# Patient Record
Sex: MALE | Race: WHITE | NOT HISPANIC OR LATINO
[De-identification: names, ages, dates, MRNs, and addresses within clinical notes are randomized per-mention and may not be internally consistent; named-entity substitution may affect disease eponyms.]

---

## 2017-06-14 ENCOUNTER — TRANSCRIPTION ENCOUNTER (OUTPATIENT)
Age: 52
End: 2017-06-14

## 2017-06-14 ENCOUNTER — OUTPATIENT (OUTPATIENT)
Dept: OUTPATIENT SERVICES | Facility: HOSPITAL | Age: 52
LOS: 1 days | End: 2017-06-14
Payer: MEDICARE

## 2017-06-14 VITALS
HEIGHT: 72 IN | RESPIRATION RATE: 14 BRPM | HEART RATE: 64 BPM | SYSTOLIC BLOOD PRESSURE: 122 MMHG | WEIGHT: 203.27 LBS | DIASTOLIC BLOOD PRESSURE: 76 MMHG | OXYGEN SATURATION: 99 % | TEMPERATURE: 97 F

## 2017-06-14 VITALS
RESPIRATION RATE: 14 BRPM | SYSTOLIC BLOOD PRESSURE: 123 MMHG | DIASTOLIC BLOOD PRESSURE: 72 MMHG | OXYGEN SATURATION: 99 % | HEART RATE: 72 BPM

## 2017-06-14 DIAGNOSIS — H25.11 AGE-RELATED NUCLEAR CATARACT, RIGHT EYE: ICD-10-CM

## 2017-06-14 PROCEDURE — 66984 XCAPSL CTRC RMVL W/O ECP: CPT | Mod: RT

## 2017-06-14 NOTE — ASU DISCHARGE PLAN (ADULT/PEDIATRIC). - NOTIFY
Bleeding that does not stop/Swelling that continues/Pain not relieved by Medications/Fever greater than 101/Persistent Nausea and Vomiting

## 2017-06-27 NOTE — ASU PATIENT PROFILE, ADULT - VISION (WITH CORRECTIVE LENSES IF THE PATIENT USUALLY WEARS THEM):
Partially impaired: cannot see medication labels or newsprint, but can see obstacles in path, and the surrounding layout; can count fingers at arm's length Partially impaired: cannot see medication labels or newsprint, but can see obstacles in path, and the surrounding layout; can count fingers at arm's length/left eye

## 2017-06-28 ENCOUNTER — TRANSCRIPTION ENCOUNTER (OUTPATIENT)
Age: 52
End: 2017-06-28

## 2017-06-28 ENCOUNTER — OUTPATIENT (OUTPATIENT)
Dept: OUTPATIENT SERVICES | Facility: HOSPITAL | Age: 52
LOS: 1 days | End: 2017-06-28
Payer: MEDICARE

## 2017-06-28 VITALS
DIASTOLIC BLOOD PRESSURE: 75 MMHG | SYSTOLIC BLOOD PRESSURE: 129 MMHG | RESPIRATION RATE: 16 BRPM | HEART RATE: 69 BPM | OXYGEN SATURATION: 100 %

## 2017-06-28 VITALS
RESPIRATION RATE: 12 BRPM | HEART RATE: 61 BPM | OXYGEN SATURATION: 100 % | WEIGHT: 207.23 LBS | TEMPERATURE: 98 F | HEIGHT: 72 IN

## 2017-06-28 DIAGNOSIS — H25.12 AGE-RELATED NUCLEAR CATARACT, LEFT EYE: ICD-10-CM

## 2017-06-28 DIAGNOSIS — Z98.49 CATARACT EXTRACTION STATUS, UNSPECIFIED EYE: Chronic | ICD-10-CM

## 2017-06-28 PROCEDURE — 66984 XCAPSL CTRC RMVL W/O ECP: CPT | Mod: LT

## 2017-06-28 NOTE — ASU DISCHARGE PLAN (ADULT/PEDIATRIC). - NOTIFY
Persistent Nausea and Vomiting/Inability to Tolerate Liquids or Foods/Pain not relieved by Medications/Increased Irritability or Sluggishness/Fever greater than 101/Bleeding that does not stop

## 2017-07-28 ENCOUNTER — NON-APPOINTMENT (OUTPATIENT)
Age: 52
End: 2017-07-28

## 2017-07-28 ENCOUNTER — APPOINTMENT (OUTPATIENT)
Dept: CARDIOLOGY | Facility: CLINIC | Age: 52
End: 2017-07-28
Payer: MEDICARE

## 2017-07-28 VITALS
SYSTOLIC BLOOD PRESSURE: 110 MMHG | WEIGHT: 208 LBS | OXYGEN SATURATION: 97 % | DIASTOLIC BLOOD PRESSURE: 68 MMHG | HEIGHT: 73 IN | HEART RATE: 61 BPM | BODY MASS INDEX: 27.57 KG/M2

## 2017-07-28 DIAGNOSIS — Z00.00 ENCOUNTER FOR GENERAL ADULT MEDICAL EXAMINATION W/OUT ABNORMAL FINDINGS: ICD-10-CM

## 2017-07-28 DIAGNOSIS — F79 UNSPECIFIED INTELLECTUAL DISABILITIES: ICD-10-CM

## 2017-07-28 DIAGNOSIS — F17.200 NICOTINE DEPENDENCE, UNSPECIFIED, UNCOMPLICATED: ICD-10-CM

## 2017-07-28 PROCEDURE — 93000 ELECTROCARDIOGRAM COMPLETE: CPT

## 2017-07-28 PROCEDURE — 99204 OFFICE O/P NEW MOD 45 MIN: CPT | Mod: 25

## 2017-07-28 RX ORDER — FOLIC ACID 1 MG/1
1 TABLET ORAL DAILY
Qty: 30 | Refills: 0 | Status: ACTIVE | COMMUNITY

## 2017-07-28 RX ORDER — ASPIRIN 81 MG
81 TABLET, DELAYED RELEASE (ENTERIC COATED) ORAL DAILY
Qty: 1 | Refills: 0 | Status: ACTIVE | COMMUNITY

## 2017-07-28 RX ORDER — DIVALPROEX SODIUM 500 MG/1
500 TABLET, DELAYED RELEASE ORAL
Refills: 0 | Status: ACTIVE | COMMUNITY

## 2017-07-28 RX ORDER — CLONAZEPAM 0.5 MG/1
0.5 TABLET ORAL
Refills: 0 | Status: ACTIVE | COMMUNITY

## 2017-07-28 RX ORDER — MULTIVITAMIN
TABLET ORAL DAILY
Refills: 0 | Status: ACTIVE | COMMUNITY

## 2017-07-28 RX ORDER — FENOFIBRATE 145 MG/1
145 TABLET, COATED ORAL
Qty: 30 | Refills: 0 | Status: ACTIVE | COMMUNITY

## 2017-08-25 ENCOUNTER — MEDICATION RENEWAL (OUTPATIENT)
Age: 52
End: 2017-08-25

## 2017-10-04 ENCOUNTER — APPOINTMENT (OUTPATIENT)
Dept: RADIOLOGY | Facility: CLINIC | Age: 52
End: 2017-10-04

## 2017-10-04 ENCOUNTER — OUTPATIENT (OUTPATIENT)
Dept: OUTPATIENT SERVICES | Facility: HOSPITAL | Age: 52
LOS: 1 days | End: 2017-10-04
Payer: MEDICARE

## 2017-10-04 DIAGNOSIS — Z00.8 ENCOUNTER FOR OTHER GENERAL EXAMINATION: ICD-10-CM

## 2017-10-04 DIAGNOSIS — Z98.49 CATARACT EXTRACTION STATUS, UNSPECIFIED EYE: Chronic | ICD-10-CM

## 2017-10-04 PROCEDURE — 77081 DXA BONE DENSITY APPENDICULR: CPT

## 2017-10-04 PROCEDURE — 77081 DXA BONE DENSITY APPENDICULR: CPT | Mod: 26

## 2018-01-12 ENCOUNTER — APPOINTMENT (OUTPATIENT)
Dept: CARDIOLOGY | Facility: CLINIC | Age: 53
End: 2018-01-12
Payer: MEDICARE

## 2018-01-12 ENCOUNTER — NON-APPOINTMENT (OUTPATIENT)
Age: 53
End: 2018-01-12

## 2018-01-12 VITALS
WEIGHT: 211 LBS | HEART RATE: 57 BPM | OXYGEN SATURATION: 98 % | HEIGHT: 73 IN | SYSTOLIC BLOOD PRESSURE: 113 MMHG | DIASTOLIC BLOOD PRESSURE: 72 MMHG | BODY MASS INDEX: 27.96 KG/M2

## 2018-01-12 PROCEDURE — 93000 ELECTROCARDIOGRAM COMPLETE: CPT

## 2018-01-12 PROCEDURE — 99214 OFFICE O/P EST MOD 30 MIN: CPT | Mod: 25

## 2018-03-29 ENCOUNTER — APPOINTMENT (OUTPATIENT)
Dept: MRI IMAGING | Facility: CLINIC | Age: 53
End: 2018-03-29

## 2018-04-04 ENCOUNTER — APPOINTMENT (OUTPATIENT)
Dept: CARDIOLOGY | Facility: CLINIC | Age: 53
End: 2018-04-04
Payer: MEDICARE

## 2018-04-04 PROCEDURE — 93306 TTE W/DOPPLER COMPLETE: CPT

## 2018-04-27 ENCOUNTER — APPOINTMENT (OUTPATIENT)
Dept: MRI IMAGING | Facility: CLINIC | Age: 53
End: 2018-04-27

## 2018-05-07 ENCOUNTER — OUTPATIENT (OUTPATIENT)
Dept: OUTPATIENT SERVICES | Facility: HOSPITAL | Age: 53
LOS: 1 days | End: 2018-05-07
Payer: MEDICARE

## 2018-05-07 ENCOUNTER — APPOINTMENT (OUTPATIENT)
Dept: MRI IMAGING | Facility: CLINIC | Age: 53
End: 2018-05-07
Payer: MEDICARE

## 2018-05-07 DIAGNOSIS — Z00.8 ENCOUNTER FOR OTHER GENERAL EXAMINATION: ICD-10-CM

## 2018-05-07 DIAGNOSIS — Z98.49 CATARACT EXTRACTION STATUS, UNSPECIFIED EYE: Chronic | ICD-10-CM

## 2018-05-07 PROCEDURE — A9585: CPT

## 2018-05-07 PROCEDURE — 74183 MRI ABD W/O CNTR FLWD CNTR: CPT | Mod: 26

## 2018-05-07 PROCEDURE — 74183 MRI ABD W/O CNTR FLWD CNTR: CPT

## 2018-05-22 ENCOUNTER — APPOINTMENT (OUTPATIENT)
Dept: CARDIOLOGY | Facility: CLINIC | Age: 53
End: 2018-05-22

## 2018-05-30 ENCOUNTER — APPOINTMENT (OUTPATIENT)
Dept: CARDIOLOGY | Facility: CLINIC | Age: 53
End: 2018-05-30
Payer: MEDICARE

## 2018-05-30 PROCEDURE — 93015 CV STRESS TEST SUPVJ I&R: CPT

## 2018-07-20 ENCOUNTER — NON-APPOINTMENT (OUTPATIENT)
Age: 53
End: 2018-07-20

## 2018-07-20 ENCOUNTER — APPOINTMENT (OUTPATIENT)
Dept: CARDIOLOGY | Facility: CLINIC | Age: 53
End: 2018-07-20
Payer: MEDICARE

## 2018-07-20 VITALS — DIASTOLIC BLOOD PRESSURE: 66 MMHG | SYSTOLIC BLOOD PRESSURE: 101 MMHG | HEART RATE: 51 BPM | OXYGEN SATURATION: 98 %

## 2018-07-20 VITALS — HEIGHT: 73 IN | BODY MASS INDEX: 28.89 KG/M2 | WEIGHT: 218 LBS

## 2018-07-20 PROBLEM — E78.1 PURE HYPERGLYCERIDEMIA: Chronic | Status: ACTIVE | Noted: 2017-06-14

## 2018-07-20 PROBLEM — F20.9 SCHIZOPHRENIA, UNSPECIFIED: Chronic | Status: ACTIVE | Noted: 2017-06-14

## 2018-07-20 PROBLEM — I10 ESSENTIAL (PRIMARY) HYPERTENSION: Chronic | Status: ACTIVE | Noted: 2017-06-14

## 2018-07-20 PROCEDURE — 99214 OFFICE O/P EST MOD 30 MIN: CPT | Mod: 25

## 2018-07-20 PROCEDURE — 93000 ELECTROCARDIOGRAM COMPLETE: CPT

## 2018-07-20 RX ORDER — MULTIVIT-MIN/FOLIC/VIT K/LYCOP 400-300MCG
50 MCG TABLET ORAL
Qty: 90 | Refills: 3 | Status: ACTIVE | COMMUNITY

## 2018-07-20 RX ORDER — PNV NO.95/FERROUS FUM/FOLIC AC 28MG-0.8MG
TABLET ORAL DAILY
Refills: 0 | Status: ACTIVE | COMMUNITY

## 2018-09-23 NOTE — ASU DISCHARGE PLAN (ADULT/PEDIATRIC). - NO HEAVY LIFTING, STRAINING, OR BENDING
Statement Selected Type 2 diabetes mellitus with diabetic cataract, unspecified whether long term insulin use

## 2018-10-16 ENCOUNTER — OUTPATIENT (OUTPATIENT)
Dept: OUTPATIENT SERVICES | Facility: HOSPITAL | Age: 53
LOS: 1 days | End: 2018-10-16
Payer: MEDICARE

## 2018-10-16 ENCOUNTER — APPOINTMENT (OUTPATIENT)
Dept: MRI IMAGING | Facility: CLINIC | Age: 53
End: 2018-10-16
Payer: MEDICARE

## 2018-10-16 DIAGNOSIS — E22.1 HYPERPROLACTINEMIA: ICD-10-CM

## 2018-10-16 DIAGNOSIS — Z98.49 CATARACT EXTRACTION STATUS, UNSPECIFIED EYE: Chronic | ICD-10-CM

## 2018-10-16 DIAGNOSIS — E29.1 TESTICULAR HYPOFUNCTION: ICD-10-CM

## 2018-10-16 DIAGNOSIS — Z00.8 ENCOUNTER FOR OTHER GENERAL EXAMINATION: ICD-10-CM

## 2018-10-16 PROCEDURE — A9585: CPT

## 2018-10-16 PROCEDURE — 70553 MRI BRAIN STEM W/O & W/DYE: CPT

## 2018-10-16 PROCEDURE — 82565 ASSAY OF CREATININE: CPT

## 2018-10-16 PROCEDURE — 70553 MRI BRAIN STEM W/O & W/DYE: CPT | Mod: 26

## 2019-02-08 ENCOUNTER — NON-APPOINTMENT (OUTPATIENT)
Age: 54
End: 2019-02-08

## 2019-02-08 ENCOUNTER — APPOINTMENT (OUTPATIENT)
Dept: CARDIOLOGY | Facility: CLINIC | Age: 54
End: 2019-02-08
Payer: MEDICARE

## 2019-02-08 VITALS — WEIGHT: 222 LBS | BODY MASS INDEX: 29.42 KG/M2 | HEIGHT: 73 IN

## 2019-02-08 VITALS — OXYGEN SATURATION: 100 % | HEART RATE: 55 BPM | DIASTOLIC BLOOD PRESSURE: 72 MMHG | SYSTOLIC BLOOD PRESSURE: 114 MMHG

## 2019-02-08 PROCEDURE — 93000 ELECTROCARDIOGRAM COMPLETE: CPT

## 2019-02-08 PROCEDURE — 99214 OFFICE O/P EST MOD 30 MIN: CPT | Mod: 25

## 2019-02-08 NOTE — HISTORY OF PRESENT ILLNESS
[FreeTextEntry1] : Patient with mental retardation with behavioral disorder, hypertension , dyslipidemia came for follow up \par \par  Patient denies any active symptoms , denies any chest pain or shortness of breath , \par \par he smokes every day 6 cigarettes a day ,started in his high school , and he does not want to  quit smoking ,.\par \par Patient had blood work    showing HDL very low  others normal range  .  his blood pressure is controlled on medication . \par \par Patient is physically active ,   \par \par Patients echo showed mild LVH ,   could not walk longer  on treadmill ,

## 2019-02-08 NOTE — ASSESSMENT
[FreeTextEntry1] : Patient with above hx \par \par \par Controlled    Hypertension continue low salt diet , continue medication , \par \par Non specific EKG changes without active cardiac symptoms , mild hypertensive heart disease LVH with normal EF , patient was unable to walk longer to achieve target heart rate , patient denies any chest pain , will continue to observe \par \par dyslipidemia , low HDL  on current medication  , without active cardiac symptoms,   follow up lipid profile ,LFT periodically as patient is on medication \par \par current active smoker   advised the patient  to quit , however he  does not want to  quit \par \par follow up after 6 months . please fax me recent blood work reports

## 2019-02-08 NOTE — PHYSICAL EXAM
[General Appearance - Well Developed] : well developed [Normal Conjunctiva] : the conjunctiva exhibited no abnormalities [Normal Oral Mucosa] : normal oral mucosa [Normal Jugular Venous A Waves Present] : normal jugular venous A waves present [] : no respiratory distress [Respiration, Rhythm And Depth] : normal respiratory rhythm and effort [Exaggerated Use Of Accessory Muscles For Inspiration] : no accessory muscle use [Auscultation Breath Sounds / Voice Sounds] : lungs were clear to auscultation bilaterally [Chest Palpation] : palpation of the chest revealed no abnormalities [Lungs Percussion] : the lungs were normal to percussion [Heart Rate And Rhythm] : heart rate and rhythm were normal [Heart Sounds] : normal S1 and S2 [Murmurs] : no murmurs present [Arterial Pulses Normal] : the arterial pulses were normal [Edema] : no peripheral edema present [Veins - Varicosity Changes] : no varicosital changes were noted in the lower extremities [Bowel Sounds] : normal bowel sounds [Abdomen Soft] : soft [Abnormal Walk] : normal gait [Nail Clubbing] : no clubbing of the fingernails [Cyanosis, Localized] : no localized cyanosis [Skin Turgor] : normal skin turgor [Oriented To Time, Place, And Person] : oriented to person, place, and time

## 2019-02-08 NOTE — REVIEW OF SYSTEMS
[Fever] : no fever [Chills] : no chills [Eyeglasses] : not currently wearing eyeglasses [Earache] : no earache [Mouth Sores] : no mouth sores [Shortness Of Breath] : no shortness of breath [Chest Pain] : no chest pain [Palpitations] : no palpitations [Cough] : no cough [Dysphagia] : no dysphagia [Urinary Frequency] : no change in urinary frequency [Skin: A Rash] : no rash: [Skin Lesions] : no skin lesions [Dizziness] : no dizziness [Convulsions] : no convulsions [Confusion] : no confusion was observed [Excessive Thirst] : no polydipsia

## 2019-05-15 ENCOUNTER — APPOINTMENT (OUTPATIENT)
Dept: MRI IMAGING | Facility: CLINIC | Age: 54
End: 2019-05-15
Payer: MEDICARE

## 2019-05-15 ENCOUNTER — OUTPATIENT (OUTPATIENT)
Dept: OUTPATIENT SERVICES | Facility: HOSPITAL | Age: 54
LOS: 1 days | End: 2019-05-15
Payer: MEDICARE

## 2019-05-15 DIAGNOSIS — Z00.8 ENCOUNTER FOR OTHER GENERAL EXAMINATION: ICD-10-CM

## 2019-05-15 DIAGNOSIS — Z98.49 CATARACT EXTRACTION STATUS, UNSPECIFIED EYE: Chronic | ICD-10-CM

## 2019-05-15 PROCEDURE — A9585: CPT

## 2019-05-15 PROCEDURE — 74183 MRI ABD W/O CNTR FLWD CNTR: CPT

## 2019-05-15 PROCEDURE — 74183 MRI ABD W/O CNTR FLWD CNTR: CPT | Mod: 26

## 2019-08-15 ENCOUNTER — NON-APPOINTMENT (OUTPATIENT)
Age: 54
End: 2019-08-15

## 2019-08-15 ENCOUNTER — APPOINTMENT (OUTPATIENT)
Dept: CARDIOLOGY | Facility: CLINIC | Age: 54
End: 2019-08-15
Payer: MEDICARE

## 2019-08-15 VITALS — HEIGHT: 73 IN | BODY MASS INDEX: 30.22 KG/M2 | WEIGHT: 228 LBS

## 2019-08-15 VITALS — DIASTOLIC BLOOD PRESSURE: 64 MMHG | OXYGEN SATURATION: 97 % | SYSTOLIC BLOOD PRESSURE: 100 MMHG | HEART RATE: 66 BPM

## 2019-08-15 PROCEDURE — 99214 OFFICE O/P EST MOD 30 MIN: CPT | Mod: 25

## 2019-08-15 PROCEDURE — 93000 ELECTROCARDIOGRAM COMPLETE: CPT

## 2019-08-16 NOTE — ASSESSMENT
[FreeTextEntry1] : HTN: Controlled\par \par MR: Mild by Echo\par \par HLD: Advised Lipids  ( Not seen on prior Labs ) \par \par Advised smoking cessation patient declines states does not wish to quit \par \par OV 6 months

## 2019-08-16 NOTE — HISTORY OF PRESENT ILLNESS
[FreeTextEntry1] : 53 Y/O [place]  PMH: Mentally Challenged HTN, HLD, MR who presents today on routine cardiac F/U\par \par Claims to be feeling well is accompanied VIA formal caregiver \par \par  Patient denies any active symptoms , denies any chest pain or shortness of breath , \par \par he smokes every day 6 cigarettes a day ,started in his high school , and he does not want to  quit smoking ,.\par \par Patient is physically active W/O limitations/restrictions  ,   \par

## 2020-02-13 ENCOUNTER — APPOINTMENT (OUTPATIENT)
Dept: CARDIOLOGY | Facility: CLINIC | Age: 55
End: 2020-02-13

## 2020-03-06 ENCOUNTER — NON-APPOINTMENT (OUTPATIENT)
Age: 55
End: 2020-03-06

## 2020-03-06 ENCOUNTER — APPOINTMENT (OUTPATIENT)
Dept: CARDIOLOGY | Facility: CLINIC | Age: 55
End: 2020-03-06
Payer: MEDICARE

## 2020-03-06 VITALS
BODY MASS INDEX: 30.48 KG/M2 | DIASTOLIC BLOOD PRESSURE: 69 MMHG | HEIGHT: 73 IN | WEIGHT: 230 LBS | SYSTOLIC BLOOD PRESSURE: 108 MMHG | OXYGEN SATURATION: 98 % | HEART RATE: 71 BPM

## 2020-03-06 VITALS
HEIGHT: 73 IN | HEART RATE: 71 BPM | BODY MASS INDEX: 30.48 KG/M2 | SYSTOLIC BLOOD PRESSURE: 108 MMHG | DIASTOLIC BLOOD PRESSURE: 69 MMHG | WEIGHT: 230 LBS

## 2020-03-06 PROCEDURE — 99214 OFFICE O/P EST MOD 30 MIN: CPT

## 2020-03-06 PROCEDURE — 93000 ELECTROCARDIOGRAM COMPLETE: CPT

## 2020-03-06 RX ORDER — NORMAL SALT TABLETS 1 G/G
1 TABLET ORAL TWICE DAILY
Refills: 0 | Status: ACTIVE | COMMUNITY

## 2020-03-06 NOTE — PHYSICAL EXAM
[General Appearance - Well Developed] : well developed [Normal Appearance] : normal appearance [Well Groomed] : well groomed [General Appearance - Well Nourished] : well nourished [No Deformities] : no deformities [General Appearance - In No Acute Distress] : no acute distress [Normal Conjunctiva] : the conjunctiva exhibited no abnormalities [] : no respiratory distress [Respiration, Rhythm And Depth] : normal respiratory rhythm and effort [Exaggerated Use Of Accessory Muscles For Inspiration] : no accessory muscle use [Auscultation Breath Sounds / Voice Sounds] : lungs were clear to auscultation bilaterally [Heart Rate And Rhythm] : heart rate and rhythm were normal [Heart Sounds] : normal S1 and S2 [Abdomen Soft] : soft [Abdomen Tenderness] : non-tender [Abnormal Walk] : normal gait [FreeTextEntry1] : No LE Edema  [Skin Color & Pigmentation] : normal skin color and pigmentation [Oriented To Time, Place, And Person] : oriented to person, place, and time

## 2020-03-06 NOTE — ASSESSMENT
[FreeTextEntry1] : HTN: Controlled continue medication \par \par MR: Mild by Echo\par \par HLD: Advised Lipids  ( Not seen on prior Labs )  will order blood work \par \par Advised smoking cessation patient declines states does not wish to quit \par \par OV 6 months

## 2020-09-10 ENCOUNTER — APPOINTMENT (OUTPATIENT)
Dept: CARDIOLOGY | Facility: CLINIC | Age: 55
End: 2020-09-10
Payer: MEDICARE

## 2020-09-10 ENCOUNTER — NON-APPOINTMENT (OUTPATIENT)
Age: 55
End: 2020-09-10

## 2020-09-10 VITALS
OXYGEN SATURATION: 98 % | DIASTOLIC BLOOD PRESSURE: 68 MMHG | BODY MASS INDEX: 32.2 KG/M2 | HEART RATE: 66 BPM | WEIGHT: 243 LBS | SYSTOLIC BLOOD PRESSURE: 106 MMHG | HEIGHT: 73 IN

## 2020-09-10 PROCEDURE — 93000 ELECTROCARDIOGRAM COMPLETE: CPT

## 2020-09-10 PROCEDURE — 99214 OFFICE O/P EST MOD 30 MIN: CPT

## 2020-09-10 NOTE — HISTORY OF PRESENT ILLNESS
[FreeTextEntry1] : Patient with hx of  Mentally Challenged HTN, HLD, MR who presents today on routine cardiac F/U along with caregiver says he is doing well , \par \par Patient denies any active symptoms , denies any chest pain or shortness of breath , \par \par he smokes every day 6 cigarettes a day , he does not want to  quit smoking ,.\par \par Patient blood work reports were not provided , ( did not do the blood work that was ordered )  his blood pressure is controlled  \par

## 2020-09-10 NOTE — PHYSICAL EXAM
[General Appearance - Well Developed] : well developed [Normal Appearance] : normal appearance [General Appearance - Well Nourished] : well nourished [Well Groomed] : well groomed [No Deformities] : no deformities [General Appearance - In No Acute Distress] : no acute distress [Normal Conjunctiva] : the conjunctiva exhibited no abnormalities [] : no respiratory distress [Respiration, Rhythm And Depth] : normal respiratory rhythm and effort [Exaggerated Use Of Accessory Muscles For Inspiration] : no accessory muscle use [Auscultation Breath Sounds / Voice Sounds] : lungs were clear to auscultation bilaterally [Heart Rate And Rhythm] : heart rate and rhythm were normal [Abdomen Tenderness] : non-tender [Heart Sounds] : normal S1 and S2 [Abdomen Soft] : soft [Abnormal Walk] : normal gait [FreeTextEntry1] : No LE Edema  [Skin Color & Pigmentation] : normal skin color and pigmentation [Oriented To Time, Place, And Person] : oriented to person, place, and time

## 2020-09-10 NOTE — ASSESSMENT
[FreeTextEntry1] : HTN: Controlled continue medication \par \par MR: Mild by Echo\par \par HLD: Advised Lipids  ( Not seen on prior Labs )  will order blood work  please fax me once results available \par \par Advised smoking cessation patient declines states does not wish to quit \par \par OV 6 months

## 2021-03-11 ENCOUNTER — APPOINTMENT (OUTPATIENT)
Dept: CARDIOLOGY | Facility: CLINIC | Age: 56
End: 2021-03-11

## 2021-06-25 NOTE — PHYSICAL EXAM
Returned call to patient. Name and  verified. Advised that we would not be able to prescribe anything for pain. She could try OTC extra strength tylenol or use orajel to the area.  She expressed understanding and was appreciative of call [General Appearance - Well Developed] : well developed [Normal Appearance] : normal appearance [Well Groomed] : well groomed [General Appearance - Well Nourished] : well nourished [No Deformities] : no deformities [General Appearance - In No Acute Distress] : no acute distress [Normal Conjunctiva] : the conjunctiva exhibited no abnormalities [] : no respiratory distress [Respiration, Rhythm And Depth] : normal respiratory rhythm and effort [Auscultation Breath Sounds / Voice Sounds] : lungs were clear to auscultation bilaterally [Exaggerated Use Of Accessory Muscles For Inspiration] : no accessory muscle use [Heart Rate And Rhythm] : heart rate and rhythm were normal [Heart Sounds] : normal S1 and S2 [Abdomen Soft] : soft [Abnormal Walk] : normal gait [Abdomen Tenderness] : non-tender [Skin Color & Pigmentation] : normal skin color and pigmentation [Oriented To Time, Place, And Person] : oriented to person, place, and time [FreeTextEntry1] : No LE Edema

## 2021-08-13 ENCOUNTER — APPOINTMENT (OUTPATIENT)
Dept: CARDIOLOGY | Facility: CLINIC | Age: 56
End: 2021-08-13
Payer: MEDICARE

## 2021-09-02 ENCOUNTER — APPOINTMENT (OUTPATIENT)
Dept: CARDIOLOGY | Facility: CLINIC | Age: 56
End: 2021-09-02
Payer: MEDICARE

## 2021-09-02 ENCOUNTER — NON-APPOINTMENT (OUTPATIENT)
Age: 56
End: 2021-09-02

## 2021-09-02 VITALS
DIASTOLIC BLOOD PRESSURE: 68 MMHG | HEART RATE: 65 BPM | BODY MASS INDEX: 31.94 KG/M2 | WEIGHT: 241 LBS | SYSTOLIC BLOOD PRESSURE: 112 MMHG | OXYGEN SATURATION: 97 % | HEIGHT: 73 IN

## 2021-09-02 PROCEDURE — 93000 ELECTROCARDIOGRAM COMPLETE: CPT

## 2021-09-02 PROCEDURE — 99214 OFFICE O/P EST MOD 30 MIN: CPT

## 2021-09-02 NOTE — HISTORY OF PRESENT ILLNESS
[FreeTextEntry1] : Patient with hx of  Mentally Challenged HTN, HLD, MR who presents today on routine cardiac F/U along with caregiver says he is doing well , \par \par Patient denies any active symptoms , denies any chest pain or shortness of breath ,  patient weight stable from last visit \par \par he smokes every day 6 cigarettes a day , he does not want to  quit smoking ,.\par \par Patient blood work reports were not provided , ( did not do the blood work that was ordered )  his blood pressure is controlled  \par

## 2021-09-02 NOTE — CARDIOLOGY SUMMARY
[de-identified] : 9/2/21 sinus bradycardia ICRBBB non specific T changes  [de-identified] : 5/30/18 inconclusive due to inadequate HR  [de-identified] : 4/4/18 Mild LVH EF 64% Trace TR MR

## 2021-09-02 NOTE — ASSESSMENT
[FreeTextEntry1] : HTN: Controlled continue medication \par \par MR: Mild by Echo\par \par abnormal ekg : possible normal variant   will obtain echo , \par \par Obesity: advised to follow diet restriction , Nutritionist   evaluation for for weight loss diet , \par \par HLD: Advised Lipids  ( Not seen on prior Labs )  will order blood work  please fax me once results available \par \par Advised smoking cessation patient declines states does not wish to quit \par \par OV 6 months

## 2021-09-02 NOTE — PHYSICAL EXAM
[No Acute Distress] : no acute distress [Obese] : obese [Normal Rate] : normal [Normal S1] : normal S1 [Normal S2] : normal S2 [S3] : no S3 [S4] : no S4 [No Murmur] : no murmurs heard [No Pitting Edema] : no pitting edema present [Right Carotid Bruit] : no bruit heard over the right carotid [Left Carotid Bruit] : no bruit heard over the left carotid [Right Femoral Bruit] : no bruit heard over the right femoral artery [Left Femoral Bruit] : no bruit heard over the left femoral artery [2+] : left 2+ [No Abnormalities] : the abdominal aorta was not enlarged and no bruit was heard

## 2021-09-13 ENCOUNTER — APPOINTMENT (OUTPATIENT)
Dept: CARDIOLOGY | Facility: CLINIC | Age: 56
End: 2021-09-13

## 2022-03-03 ENCOUNTER — NON-APPOINTMENT (OUTPATIENT)
Age: 57
End: 2022-03-03

## 2022-03-03 ENCOUNTER — APPOINTMENT (OUTPATIENT)
Dept: CARDIOLOGY | Facility: CLINIC | Age: 57
End: 2022-03-03
Payer: MEDICARE

## 2022-03-03 VITALS
SYSTOLIC BLOOD PRESSURE: 110 MMHG | OXYGEN SATURATION: 98 % | BODY MASS INDEX: 31.68 KG/M2 | WEIGHT: 239 LBS | HEART RATE: 62 BPM | DIASTOLIC BLOOD PRESSURE: 70 MMHG | HEIGHT: 73 IN

## 2022-03-03 PROCEDURE — 93000 ELECTROCARDIOGRAM COMPLETE: CPT

## 2022-03-03 PROCEDURE — 99214 OFFICE O/P EST MOD 30 MIN: CPT

## 2022-03-03 PROCEDURE — 93306 TTE W/DOPPLER COMPLETE: CPT

## 2022-03-03 NOTE — PHYSICAL EXAM
[No Acute Distress] : no acute distress [Obese] : obese [Normal Conjunctiva] : normal conjunctiva [Normal Venous Pressure] : normal venous pressure [No Carotid Bruit] : no carotid bruit [Normal Rate] : normal [Normal S1] : normal S1 [Normal S2] : normal S2 [No Murmur] : no murmurs heard [No Pitting Edema] : no pitting edema present [2+] : left 2+ [No Abnormalities] : the abdominal aorta was not enlarged and no bruit was heard [Clear Lung Fields] : clear lung fields [Good Air Entry] : good air entry [No Respiratory Distress] : no respiratory distress  [Soft] : abdomen soft [Non Tender] : non-tender [No Masses/organomegaly] : no masses/organomegaly [Normal Bowel Sounds] : normal bowel sounds [Normal Gait] : normal gait [No Edema] : no edema [No Cyanosis] : no cyanosis [No Clubbing] : no clubbing [No Varicosities] : no varicosities [No Rash] : no rash [No Skin Lesions] : no skin lesions [Moves all extremities] : moves all extremities [No Focal Deficits] : no focal deficits [Normal Speech] : normal speech [Alert and Oriented] : alert and oriented [Normal memory] : normal memory [S3] : no S3 [S4] : no S4 [Right Carotid Bruit] : no bruit heard over the right carotid [Left Carotid Bruit] : no bruit heard over the left carotid [Right Femoral Bruit] : no bruit heard over the right femoral artery [Left Femoral Bruit] : no bruit heard over the left femoral artery

## 2022-03-03 NOTE — HISTORY OF PRESENT ILLNESS
[FreeTextEntry1] : Patient with hx of  Mentally Challenged HTN, HLD, MR who presents today on routine cardiac F/U along with caregiver says he is doing well , \par \par Patient denies any active symptoms , denies any chest pain or shortness of breath ,  patient weight stable from last visit \par \par he smokes every day 6 cigarettes a day , he does not want to  quit smoking ,.\par \par Patient blood work reports  reviewed , elevated TG low HDL  his blood pressure is controlled  \par

## 2022-03-03 NOTE — ASSESSMENT
[FreeTextEntry1] : HTN: hypertensive heart disease , Controlled continue medication \par \par MR: Mild by Echo  will obtain follow up \par \par abnormal ekg : possible normal variant   \par \par Obesity: advised to follow diet restriction , Nutritionist   evaluation for for weight loss diet , \par \par HLD:  elevated TG low HDL , advised the patient to follow up to loose weight  continue fenofibrate ,  will add 2 cap fish oil 1 grams daily repeat blood work  \par \par Advised smoking cessation patient declines states does not wish to quit \par \par OV 6 months

## 2022-03-03 NOTE — CARDIOLOGY SUMMARY
[de-identified] : 3/3/22  ICRBBB non specific T changes  [de-identified] : 5/30/18 inconclusive due to inadequate HR  [de-identified] : 4/4/18 Mild LVH EF 64% Trace TR MR

## 2022-09-01 ENCOUNTER — NON-APPOINTMENT (OUTPATIENT)
Age: 57
End: 2022-09-01

## 2022-09-01 ENCOUNTER — APPOINTMENT (OUTPATIENT)
Dept: CARDIOLOGY | Facility: CLINIC | Age: 57
End: 2022-09-01

## 2022-09-01 VITALS
WEIGHT: 242 LBS | OXYGEN SATURATION: 97 % | HEIGHT: 73 IN | HEART RATE: 64 BPM | BODY MASS INDEX: 32.07 KG/M2 | SYSTOLIC BLOOD PRESSURE: 133 MMHG | DIASTOLIC BLOOD PRESSURE: 76 MMHG

## 2022-09-01 VITALS — DIASTOLIC BLOOD PRESSURE: 76 MMHG | SYSTOLIC BLOOD PRESSURE: 126 MMHG

## 2022-09-01 PROCEDURE — 93000 ELECTROCARDIOGRAM COMPLETE: CPT

## 2022-09-01 PROCEDURE — 99214 OFFICE O/P EST MOD 30 MIN: CPT

## 2022-09-06 NOTE — PHYSICAL EXAM
[Edema ___] : edema [unfilled] [S3] : no S3 [S4] : no S4 [Right Carotid Bruit] : no bruit heard over the right carotid [Left Carotid Bruit] : no bruit heard over the left carotid [Right Femoral Bruit] : no bruit heard over the right femoral artery [Left Femoral Bruit] : no bruit heard over the left femoral artery

## 2022-09-06 NOTE — HISTORY OF PRESENT ILLNESS
[FreeTextEntry1] : Patient with hx of  Mentally Challenged HTN, HLD, MR who presents today on routine cardiac F/U along with caregiver says he is doing well ,  \par \par Patient denies any active symptoms , denies any chest pain or shortness of breath ,  patient weight stable from last visit   patient had echo showed LVH with BIBIANA without outflow tract obstruction ,   ekg showed non specific T changes \par \par he smokes every day 6 cigarettes a day , he does not want to  quit smoking ,.\par \par Patient blood work reports  reviewed ,july 2022  elevated TG   , , HDL 39 ,  \par \par his blood pressure is controlled  , does have mild ankle swelling , sitts most of the day , denies any sob \par

## 2022-09-06 NOTE — CARDIOLOGY SUMMARY
[de-identified] : 9/1/22  ICRBBB non specific T changes  [de-identified] : 5/30/18 inconclusive due to inadequate HR  [de-identified] : 3/03/22 Mild LVH EF 64% Jame without  outflow tract obstruction ,Trace TR MR

## 2022-09-06 NOTE — ASSESSMENT
[FreeTextEntry1] : HTN: hypertensive heart disease ,   BIBIANA  without out flow tract obstruction , : Controlled continue medication and diet restriction \par \par MR: Mild by Echo   stable \par \par abnormal ekg :  due to hypertensive heart disease  \par \par Mild ankle edema , possibly due to dependent edema , low salt diet , elevate feet , \par \par Obesity: advised to follow diet restriction , Nutritionist   evaluation for for weight loss diet , \par \par HLD:  elevated TG low HDL , advised the patient to follow up to loose weight  continue fenofibrate ,  continue 2 cap fish oil 1 grams daily repeat blood work  \par \par Advised smoking cessation patient declines states does not wish to quit   repeatedly explained to patient about long term consequences \par \par OV 6 months

## 2022-11-04 ENCOUNTER — NON-APPOINTMENT (OUTPATIENT)
Age: 57
End: 2022-11-04

## 2022-11-04 ENCOUNTER — APPOINTMENT (OUTPATIENT)
Dept: CARDIOLOGY | Facility: CLINIC | Age: 57
End: 2022-11-04

## 2022-11-04 VITALS
WEIGHT: 255 LBS | HEART RATE: 72 BPM | DIASTOLIC BLOOD PRESSURE: 66 MMHG | OXYGEN SATURATION: 97 % | SYSTOLIC BLOOD PRESSURE: 108 MMHG | BODY MASS INDEX: 33.8 KG/M2 | HEIGHT: 73 IN

## 2022-11-04 VITALS
SYSTOLIC BLOOD PRESSURE: 108 MMHG | DIASTOLIC BLOOD PRESSURE: 66 MMHG | RESPIRATION RATE: 14 BRPM | OXYGEN SATURATION: 97 % | HEART RATE: 72 BPM

## 2022-11-04 DIAGNOSIS — U07.1 COVID-19: ICD-10-CM

## 2022-11-04 PROCEDURE — 93000 ELECTROCARDIOGRAM COMPLETE: CPT

## 2022-11-04 PROCEDURE — 99214 OFFICE O/P EST MOD 30 MIN: CPT | Mod: CS

## 2022-11-04 RX ORDER — TAMSULOSIN HYDROCHLORIDE 0.4 MG/1
0.4 CAPSULE ORAL
Refills: 0 | Status: DISCONTINUED | COMMUNITY
End: 2022-11-04

## 2022-11-04 NOTE — CARDIOLOGY SUMMARY
[de-identified] : 11/4/22  ICRBBB non specific T changes  [de-identified] : 5/30/18 inconclusive due to inadequate HR  [de-identified] : 3/03/22 Mild LVH EF 64% Jame without  outflow tract obstruction ,Trace TR MR

## 2022-11-04 NOTE — PHYSICAL EXAM
[No Acute Distress] : no acute distress [Obese] : obese [Normal Conjunctiva] : normal conjunctiva [Normal Venous Pressure] : normal venous pressure [No Carotid Bruit] : no carotid bruit [Normal Rate] : normal [Normal S1] : normal S1 [Normal S2] : normal S2 [No Murmur] : no murmurs heard [No Pitting Edema] : no pitting edema present [2+] : left 2+ [No Abnormalities] : the abdominal aorta was not enlarged and no bruit was heard [Clear Lung Fields] : clear lung fields [Good Air Entry] : good air entry [No Respiratory Distress] : no respiratory distress  [Soft] : abdomen soft [Non Tender] : non-tender [No Masses/organomegaly] : no masses/organomegaly [Normal Bowel Sounds] : normal bowel sounds [Normal Gait] : normal gait [No Edema] : no edema [No Cyanosis] : no cyanosis [No Clubbing] : no clubbing [No Varicosities] : no varicosities [Edema ___] : edema [unfilled] [No Rash] : no rash [No Skin Lesions] : no skin lesions [Moves all extremities] : moves all extremities [No Focal Deficits] : no focal deficits [Normal Speech] : normal speech [Alert and Oriented] : alert and oriented [Normal memory] : normal memory [S3] : no S3 [S4] : no S4 [Right Carotid Bruit] : no bruit heard over the right carotid [Left Carotid Bruit] : no bruit heard over the left carotid [Right Femoral Bruit] : no bruit heard over the right femoral artery [Left Femoral Bruit] : no bruit heard over the left femoral artery

## 2022-11-04 NOTE — HISTORY OF PRESENT ILLNESS
[FreeTextEntry1] : Patient with hx of  Mentally Challenged HTN, HLD, MR who presents today on routine cardiac F/U after having  covid  october 17 th , recovered well without any complication ,by caregiver says he is doing well ,  \par \par Patient denies any active symptoms , denies any chest pain or shortness of breath ,  patient weight stable from last visit   patient had echo showed LVH with BIBIANA without outflow tract obstruction ,   ekg showed non specific T changes \par \par he smokes every day 6 cigarettes a day , he does not want to  quit smoking ,.\par \par Patient blood work reports  reviewed ,july 2022  elevated TG ,                              , , HDL 39 ,  \par \par his blood pressure is controlled  , does have mild ankle swelling , sitts most of the day , denies any sob \par

## 2022-11-04 NOTE — ASSESSMENT
[FreeTextEntry1] : \par s/p covid infection without complication  no active symptoms ,  stable no further testing \par \par HTN: hypertensive heart disease ,   BIBIANA  without out flow tract obstruction , : Controlled continue medication and diet restriction \par \par MR: Mild by Echo   stable \par \par abnormal ekg :  due to hypertensive heart disease , stable \par \par Mild ankle edema , possibly due to dependent edema , low salt diet , elevate feet , \par \par Obesity: advised to follow diet restriction , Nutritionist   evaluation for for weight loss diet , \par \par HLD:  elevated TG low HDL , advised the patient to follow up to loose weight  continue fenofibrate ,  continue 2 cap fish oil 1 grams daily repeat blood work  \par \par Advised smoking cessation patient declines states does not wish to quit   repeatedly explained to patient about long term consequences \par \par discussed with nurse \par \par OV 6 months as scheduled before

## 2023-01-17 ENCOUNTER — RX RENEWAL (OUTPATIENT)
Age: 58
End: 2023-01-17

## 2023-03-02 ENCOUNTER — APPOINTMENT (OUTPATIENT)
Dept: CARDIOLOGY | Facility: CLINIC | Age: 58
End: 2023-03-02
Payer: MEDICARE

## 2023-03-02 ENCOUNTER — NON-APPOINTMENT (OUTPATIENT)
Age: 58
End: 2023-03-02

## 2023-03-02 VITALS
SYSTOLIC BLOOD PRESSURE: 124 MMHG | WEIGHT: 255 LBS | BODY MASS INDEX: 33.8 KG/M2 | OXYGEN SATURATION: 96 % | DIASTOLIC BLOOD PRESSURE: 78 MMHG | HEART RATE: 83 BPM | RESPIRATION RATE: 14 BRPM | TEMPERATURE: 98 F | HEIGHT: 73 IN

## 2023-03-02 VITALS
HEART RATE: 83 BPM | HEIGHT: 73 IN | OXYGEN SATURATION: 96 % | DIASTOLIC BLOOD PRESSURE: 78 MMHG | SYSTOLIC BLOOD PRESSURE: 124 MMHG | TEMPERATURE: 98 F

## 2023-03-02 DIAGNOSIS — F20.9 SCHIZOPHRENIA, UNSPECIFIED: ICD-10-CM

## 2023-03-02 PROCEDURE — 99406 BEHAV CHNG SMOKING 3-10 MIN: CPT

## 2023-03-02 PROCEDURE — 93000 ELECTROCARDIOGRAM COMPLETE: CPT

## 2023-03-02 PROCEDURE — 99214 OFFICE O/P EST MOD 30 MIN: CPT

## 2023-03-02 NOTE — PHYSICAL EXAM
[No Acute Distress] : no acute distress [Obese] : obese [Normal Conjunctiva] : normal conjunctiva [No Carotid Bruit] : no carotid bruit [Normal Venous Pressure] : normal venous pressure [Normal Rate] : normal [Normal S1] : normal S1 [Normal S2] : normal S2 [No Murmur] : no murmurs heard [No Pitting Edema] : no pitting edema present [2+] : left 2+ [No Abnormalities] : the abdominal aorta was not enlarged and no bruit was heard [Clear Lung Fields] : clear lung fields [Good Air Entry] : good air entry [No Respiratory Distress] : no respiratory distress  [Soft] : abdomen soft [Non Tender] : non-tender [Normal Bowel Sounds] : normal bowel sounds [Normal Gait] : normal gait [No Edema] : no edema [No Cyanosis] : no cyanosis [No Clubbing] : no clubbing [No Varicosities] : no varicosities [Edema ___] : edema [unfilled] [No Rash] : no rash [Moves all extremities] : moves all extremities [No Skin Lesions] : no skin lesions [No Focal Deficits] : no focal deficits [Normal Speech] : normal speech [Alert and Oriented] : alert and oriented [Normal memory] : normal memory [S3] : no S3 [S4] : no S4 [Right Carotid Bruit] : no bruit heard over the right carotid [Left Carotid Bruit] : no bruit heard over the left carotid [Right Femoral Bruit] : no bruit heard over the right femoral artery [Left Femoral Bruit] : no bruit heard over the left femoral artery

## 2023-03-02 NOTE — CARDIOLOGY SUMMARY
[de-identified] : 3/2/23 sinus rhythm   ICRBBB  J point ST elevation v1 to V4  ve non specific T changes   simialr to previous ekgs  [de-identified] : 5/30/18 inconclusive due to inadequate HR  [de-identified] : 3/03/22 Mild LVH EF 64% Jame without  outflow tract obstruction ,Trace TR MR

## 2023-03-02 NOTE — ASSESSMENT
[FreeTextEntry1] : \par HTN: hypertensive heart disease ,   BIBIANA  without out flow tract obstruction , : Controlled continue medication and diet restriction \par \par MR: Mild by Echo   stable \par \par abnormal ekg :  due to hypertensive heart disease , stable \par \par Mild ankle edema , possibly due to dependent edema , low salt diet , elevate feet , \par \par Obesity: advised to follow diet restriction , Nutritionist   evaluation for for weight loss diet , \par \par HLD:  elevated TG low HDL , advised the patient to follow up to loose weight  continue fenofibrate ,  continue 2 cap fish oil 1 grams daily repeat blood work  \par \par Advised smoking cessation patient declines states does not wish to quit   repeatedly explained to patient about long term consequences \par \par discussed with nurse \par \par OV 6 months as scheduled before

## 2023-03-02 NOTE — HISTORY OF PRESENT ILLNESS
[FreeTextEntry1] : Patient with hx of  Mentally Challenged HTN, HLD, MR who presents today on routine cardiac F/U accompanied by caregiver , says he is feeling fine \par \par Patient denies any active symptoms , denies any chest pain or shortness of breath ,  patient weight stable from last visit   patient had echo showed LVH with BIBIANA without outflow tract obstruction ,   ekg showed non specific ST T changes similar to prior \par \par he smokes every day 6 cigarettes a day , he does not want to  quit smoking ,.\par \par Patient blood work reports  reviewed ,july 2022  elevated TG ,                              , , HDL 39 ,  \par \par his blood pressure is controlled  , does have mild ankle swelling , sitts most of the day , denies any sob \par

## 2023-09-15 ENCOUNTER — APPOINTMENT (OUTPATIENT)
Dept: CARDIOLOGY | Facility: CLINIC | Age: 58
End: 2023-09-15
Payer: MEDICARE

## 2023-09-15 VITALS — SYSTOLIC BLOOD PRESSURE: 120 MMHG | DIASTOLIC BLOOD PRESSURE: 78 MMHG

## 2023-09-15 VITALS
BODY MASS INDEX: 33 KG/M2 | DIASTOLIC BLOOD PRESSURE: 68 MMHG | OXYGEN SATURATION: 98 % | WEIGHT: 249 LBS | HEART RATE: 65 BPM | SYSTOLIC BLOOD PRESSURE: 138 MMHG | HEIGHT: 73 IN

## 2023-09-15 PROCEDURE — 99214 OFFICE O/P EST MOD 30 MIN: CPT

## 2023-09-15 PROCEDURE — 93000 ELECTROCARDIOGRAM COMPLETE: CPT

## 2024-01-19 ENCOUNTER — APPOINTMENT (OUTPATIENT)
Dept: RADIOLOGY | Facility: CLINIC | Age: 59
End: 2024-01-19
Payer: MEDICARE

## 2024-01-19 ENCOUNTER — OUTPATIENT (OUTPATIENT)
Dept: OUTPATIENT SERVICES | Facility: HOSPITAL | Age: 59
LOS: 1 days | End: 2024-01-19
Payer: MEDICARE

## 2024-01-19 DIAGNOSIS — Z00.00 ENCOUNTER FOR GENERAL ADULT MEDICAL EXAMINATION WITHOUT ABNORMAL FINDINGS: ICD-10-CM

## 2024-01-19 DIAGNOSIS — Z98.49 CATARACT EXTRACTION STATUS, UNSPECIFIED EYE: Chronic | ICD-10-CM

## 2024-01-19 PROCEDURE — 73630 X-RAY EXAM OF FOOT: CPT

## 2024-01-19 PROCEDURE — 73630 X-RAY EXAM OF FOOT: CPT | Mod: 26,RT

## 2024-03-29 ENCOUNTER — NON-APPOINTMENT (OUTPATIENT)
Age: 59
End: 2024-03-29

## 2024-03-29 ENCOUNTER — APPOINTMENT (OUTPATIENT)
Dept: CARDIOLOGY | Facility: CLINIC | Age: 59
End: 2024-03-29
Payer: MEDICARE

## 2024-03-29 VITALS
DIASTOLIC BLOOD PRESSURE: 70 MMHG | HEIGHT: 73 IN | BODY MASS INDEX: 34.85 KG/M2 | HEART RATE: 74 BPM | OXYGEN SATURATION: 97 % | SYSTOLIC BLOOD PRESSURE: 116 MMHG | WEIGHT: 263 LBS

## 2024-03-29 PROCEDURE — 93000 ELECTROCARDIOGRAM COMPLETE: CPT

## 2024-03-29 PROCEDURE — 99215 OFFICE O/P EST HI 40 MIN: CPT

## 2024-03-29 PROCEDURE — G2211 COMPLEX E/M VISIT ADD ON: CPT

## 2024-03-29 RX ORDER — TAMSULOSIN HYDROCHLORIDE 0.4 MG/1
0.4 CAPSULE ORAL
Qty: 90 | Refills: 1 | Status: DISCONTINUED | COMMUNITY
End: 2024-03-29

## 2024-03-29 RX ORDER — ASPIRIN 81 MG
600-200 TABLET, DELAYED RELEASE (ENTERIC COATED) ORAL
Refills: 0 | Status: ACTIVE | COMMUNITY

## 2024-03-29 RX ORDER — OMEGA-3/DHA/EPA/FISH OIL 300-1000MG
1000 CAPSULE ORAL DAILY
Qty: 60 | Refills: 4 | Status: DISCONTINUED | COMMUNITY
Start: 2022-03-03 | End: 2024-03-29

## 2024-03-29 RX ORDER — PETROLATUM 420 MG/G
OINTMENT TOPICAL
Refills: 0 | Status: ACTIVE | COMMUNITY

## 2024-03-29 NOTE — PHYSICAL EXAM
[No Acute Distress] : no acute distress [Obese] : obese [Normal Conjunctiva] : normal conjunctiva [Normal Venous Pressure] : normal venous pressure [No Carotid Bruit] : no carotid bruit [Normal Rate] : normal [Normal S1] : normal S1 [Normal S2] : normal S2 [No Murmur] : no murmurs heard [No Pitting Edema] : no pitting edema present [2+] : left 2+ [No Abnormalities] : the abdominal aorta was not enlarged and no bruit was heard [Clear Lung Fields] : clear lung fields [Good Air Entry] : good air entry [No Respiratory Distress] : no respiratory distress  [Soft] : abdomen soft [Normal Bowel Sounds] : normal bowel sounds [Non Tender] : non-tender [Normal Gait] : normal gait [No Edema] : no edema [No Cyanosis] : no cyanosis [No Clubbing] : no clubbing [No Varicosities] : no varicosities [Edema ___] : edema [unfilled] [No Rash] : no rash [No Skin Lesions] : no skin lesions [Moves all extremities] : moves all extremities [No Focal Deficits] : no focal deficits [Normal Speech] : normal speech [Alert and Oriented] : alert and oriented [Normal memory] : normal memory [S3] : no S3 [S4] : no S4 [Right Carotid Bruit] : no bruit heard over the right carotid [Left Carotid Bruit] : no bruit heard over the left carotid [Right Femoral Bruit] : no bruit heard over the right femoral artery [Left Femoral Bruit] : no bruit heard over the left femoral artery

## 2024-03-29 NOTE — ASSESSMENT
[FreeTextEntry1] : Abnormal EKG :  worsened baseline ST T changes  without chest pain : hypertensive heart disease ?? rule out ischemia , will obtain echo to assess ventricular function , exercise nuclear stress test  will obtain routine blood work   HTN: hypertensive heart disease ,   BIBIANA  without out flow tract obstruction, : Controlled continue medication and diet restriction.   MR: Mild by Echo   stable   Mild ankle edema, possibly due to dependent edema , low salt diet , elevate feet ,   Obesity: advised to follow diet restriction, Nutritionist   evaluation for for weight loss diet ,   HLD:  elevated TG low HDL , advised the patient to follow up to loose weight continue fenofibrate,  continue 2 cap fish oil 1 grams daily repeat blood work    Advised smoking cessation patient declines states does not wish to quit   repeatedly explained to patient about long term consequences.   discussed with nurse

## 2024-03-29 NOTE — COUNSELING
Patient states that she is supposed to have an ultra sound scheduled  she has a  Vein procedure 11/12. Patient needs ultra sound prior to that date please enter order contact number is to  [facility] [phone] or  [phone]   [FreeTextEntry1] : 10

## 2024-03-29 NOTE — HISTORY OF PRESENT ILLNESS
[FreeTextEntry1] : Patient with hx of  Mentally Challenged HTN, HLD, MR who presents today on routine cardiac F/U accompanied by caregiver , says he is feeling fine   Patient denies any active symptoms , denies any chest pain or shortness of breath ,  patient weight stable from last visit   patient had echo showed LVH with BIBIANA without outflow tract obstruction ,   ekg showed non specific ST T changes worsened compared to prior , his BP is normal range   he smokes every day 6 cigarettes a day , he does not want to  quit smoking ,.  Patient blood work reports  reviewed ,july 2022  elevated TG ,   , , HDL 39 ,    his blood pressure is controlled  , does have mild ankle swelling , sitts most of the day , denies any sob

## 2024-03-29 NOTE — CARDIOLOGY SUMMARY
[de-identified] : 3/29/24  sinus rhythm  ST T changes  Inferolateral leads  worsened ST T  changes compared to before  [de-identified] : 5/30/18 inconclusive due to inadequate HR  [de-identified] : 3/03/22 Mild LVH EF 64% Jame without  outflow tract obstruction ,Trace TR MR

## 2024-04-02 ENCOUNTER — APPOINTMENT (OUTPATIENT)
Dept: CARDIOLOGY | Facility: CLINIC | Age: 59
End: 2024-04-02
Payer: MEDICARE

## 2024-04-02 PROCEDURE — 93306 TTE W/DOPPLER COMPLETE: CPT

## 2024-04-04 ENCOUNTER — APPOINTMENT (OUTPATIENT)
Dept: CARDIOLOGY | Facility: CLINIC | Age: 59
End: 2024-04-04
Payer: MEDICARE

## 2024-04-04 PROCEDURE — 93015 CV STRESS TEST SUPVJ I&R: CPT

## 2024-04-04 PROCEDURE — A9500: CPT

## 2024-04-04 PROCEDURE — 78452 HT MUSCLE IMAGE SPECT MULT: CPT

## 2024-05-10 ENCOUNTER — APPOINTMENT (OUTPATIENT)
Dept: CARDIOLOGY | Facility: CLINIC | Age: 59
End: 2024-05-10
Payer: MEDICARE

## 2024-05-10 ENCOUNTER — NON-APPOINTMENT (OUTPATIENT)
Age: 59
End: 2024-05-10

## 2024-05-10 VITALS
BODY MASS INDEX: 34.85 KG/M2 | DIASTOLIC BLOOD PRESSURE: 68 MMHG | OXYGEN SATURATION: 96 % | HEIGHT: 73 IN | SYSTOLIC BLOOD PRESSURE: 126 MMHG | WEIGHT: 263 LBS | HEART RATE: 76 BPM

## 2024-05-10 DIAGNOSIS — I11.0 HYPERTENSIVE HEART DISEASE WITH HEART FAILURE: ICD-10-CM

## 2024-05-10 DIAGNOSIS — I10 ESSENTIAL (PRIMARY) HYPERTENSION: ICD-10-CM

## 2024-05-10 DIAGNOSIS — E66.9 OBESITY, UNSPECIFIED: ICD-10-CM

## 2024-05-10 DIAGNOSIS — E78.5 HYPERLIPIDEMIA, UNSPECIFIED: ICD-10-CM

## 2024-05-10 DIAGNOSIS — R94.31 ABNORMAL ELECTROCARDIOGRAM [ECG] [EKG]: ICD-10-CM

## 2024-05-10 PROCEDURE — 99214 OFFICE O/P EST MOD 30 MIN: CPT

## 2024-05-10 PROCEDURE — G2211 COMPLEX E/M VISIT ADD ON: CPT

## 2024-05-10 PROCEDURE — 93000 ELECTROCARDIOGRAM COMPLETE: CPT

## 2024-05-10 RX ORDER — OLMESARTAN MEDOXOMIL 40 MG/1
40 TABLET, FILM COATED ORAL DAILY
Refills: 0 | Status: ACTIVE | COMMUNITY

## 2024-05-10 RX ORDER — OLANZAPINE 2.5 MG/1
2.5 TABLET, FILM COATED ORAL DAILY
Refills: 0 | Status: ACTIVE | COMMUNITY

## 2024-05-10 RX ORDER — OLMESARTAN MEDOXOMIL 40 MG/1
40 TABLET, FILM COATED ORAL DAILY
Refills: 0 | Status: DISCONTINUED | COMMUNITY
End: 2024-05-10

## 2024-05-10 RX ORDER — OLANZAPINE 10 MG/1
10 TABLET, FILM COATED ORAL
Refills: 0 | Status: ACTIVE | COMMUNITY

## 2024-05-10 RX ORDER — OLANZAPINE 10 MG/1
10 TABLET ORAL
Refills: 0 | Status: DISCONTINUED | COMMUNITY
End: 2024-05-10

## 2024-05-10 RX ORDER — OLANZAPINE 2.5 MG/1
2.5 TABLET ORAL DAILY
Refills: 0 | Status: DISCONTINUED | COMMUNITY
End: 2024-05-10

## 2024-05-10 NOTE — PHYSICAL EXAM
[No Acute Distress] : no acute distress [Obese] : obese [Normal Conjunctiva] : normal conjunctiva [Normal Venous Pressure] : normal venous pressure [No Carotid Bruit] : no carotid bruit [Normal Rate] : normal [Normal S1] : normal S1 [Normal S2] : normal S2 [No Murmur] : no murmurs heard [No Pitting Edema] : no pitting edema present [2+] : left 2+ [No Abnormalities] : the abdominal aorta was not enlarged and no bruit was heard [Clear Lung Fields] : clear lung fields [Good Air Entry] : good air entry [No Respiratory Distress] : no respiratory distress  [Soft] : abdomen soft [Non Tender] : non-tender [Normal Bowel Sounds] : normal bowel sounds [Normal Gait] : normal gait [No Edema] : no edema [No Cyanosis] : no cyanosis [No Clubbing] : no clubbing [No Varicosities] : no varicosities [Edema ___] : edema [unfilled] [No Rash] : no rash [No Skin Lesions] : no skin lesions [Moves all extremities] : moves all extremities [No Focal Deficits] : no focal deficits [Normal Speech] : normal speech [Alert and Oriented] : alert and oriented [Normal memory] : normal memory [S3] : no S3 [S4] : no S4 [Right Carotid Bruit] : no bruit heard over the right carotid [Left Carotid Bruit] : no bruit heard over the left carotid [Right Femoral Bruit] : no bruit heard over the right femoral artery [Left Femoral Bruit] : no bruit heard over the left femoral artery

## 2024-05-10 NOTE — HISTORY OF PRESENT ILLNESS
[FreeTextEntry1] : Patient with hx of  Mentally Challenged HTN, HLD, MR who presents today on routine cardiac F/U accompanied by caregiver , says he is feeling fine , had recommended tests , had normal chemical stress test ,  echo showed mild LVH normal EF   Patient denies any active symptoms , denies any chest pain or shortness of breath ,  patient weight stable from last visit   ekg showed non specific ST T changes worsened compared to prior , his BP is normal range   he smokes every day 6 cigarettes a day , he does not want to  quit smoking ,.  Patient blood work reports  reviewed ,july 2022  elevated TG ,   , , HDL 39 ,    his blood pressure is controlled  , does have mild ankle swelling , sitts most of the day , denies any sob

## 2024-05-10 NOTE — ASSESSMENT
[FreeTextEntry1] : Abnormal EKG :  worsened baseline ST T changes  without chest pain : hypertensive heart disease  had LVH normal EF , no evidence of ischemia on chemical nuclear stress test , will continue antihypertensive medication   HTN: hypertensive heart disease ,   BIBIANA  without out flow tract obstruction, which was not noted on recent echo : Controlled continue medication and diet restriction.   MR: Mild by Echo   stable   Mild ankle edema, possibly due to dependent edema , low salt diet , elevate feet ,   Obesity: advised to follow diet restriction, Nutritionist   evaluation for for weight loss diet ,   HLD:  elevated TG low HDL , advised the patient to follow up to loose weight continue fenofibrate,  continue 2 cap fish oil 1 grams daily   Advised smoking cessation patient declines states does not wish to quit   repeatedly explained to patient about long term consequences.   discussed with nurse

## 2024-05-10 NOTE — CARDIOLOGY SUMMARY
[de-identified] : 3/29/24  sinus rhythm  ST T changes  Inferolateral leads  worsened ST T  changes compared to before  5/10/24  sinus rhythm ST T changes as before  [de-identified] : 4/4/24   Normal chemical nuclear stress test  [de-identified] : 4/2/24 Mild LVH EF55-60% ,Trace TR MR

## 2024-08-28 NOTE — PHYSICAL EXAM
[No Acute Distress] : no acute distress [Obese] : obese [Normal Conjunctiva] : normal conjunctiva [Normal Venous Pressure] : normal venous pressure [No Carotid Bruit] : no carotid bruit [Normal Rate] : normal [Normal S1] : normal S1 [Normal S2] : normal S2 [S3] : no S3 [S4] : no S4 [No Murmur] : no murmurs heard [No Pitting Edema] : no pitting edema present [Right Carotid Bruit] : no bruit heard over the right carotid [Left Carotid Bruit] : no bruit heard over the left carotid [Right Femoral Bruit] : no bruit heard over the right femoral artery [Left Femoral Bruit] : no bruit heard over the left femoral artery [2+] : left 2+ [No Abnormalities] : the abdominal aorta was not enlarged and no bruit was heard [Clear Lung Fields] : clear lung fields [Good Air Entry] : good air entry [No Respiratory Distress] : no respiratory distress  [Soft] : abdomen soft [Non Tender] : non-tender [Normal Bowel Sounds] : normal bowel sounds [Normal Gait] : normal gait [No Edema] : no edema [No Cyanosis] : no cyanosis [No Clubbing] : no clubbing [No Varicosities] : no varicosities [Edema ___] : edema [unfilled] [No Rash] : no rash [No Skin Lesions] : no skin lesions [Moves all extremities] : moves all extremities [No Focal Deficits] : no focal deficits [Normal Speech] : normal speech [Alert and Oriented] : alert and oriented [Normal memory] : normal memory

## 2024-08-28 NOTE — CARDIOLOGY SUMMARY
[de-identified] : 3/29/24  sinus rhythm  ST T changes  Inferolateral leads  worsened ST T  changes compared to before  5/10/24  sinus rhythm ST T changes as before  [de-identified] : 4/4/24   Normal chemical nuclear stress test  [de-identified] : 4/2/24 Mild LVH EF55-60% ,Trace TR MR

## 2024-08-28 NOTE — COUNSELING
Jass Diaz MD 5/4/2021  2:54 PM CDT       Diana Fonseca.     I called her primary phone to communicate results, but she didn't  and voicemail isn't set up.     She has diastolic dysfunction, but no significant valve disease.     No change to current therapy.     Thanks.               Spoke with patient.  Relayed echo results, noting mild abnormalities with diastolic dysfunction and mild tricuspid regurgitation.   Patient verbalized understanding.  No further questions at this time.   
Echo 5/3/2021    1. Upper normal left ventricular size, moderate LVH and normal systolic function, LVEF 65%. No regional wall motion abnormalities.  Normal LV global longitudinal strain, -20%.  Grade II/IV diastolic dysfunction, moderately elevated filling pressures.  2. Normal right ventricular size and systolic function.  3. Moderate left atrial enlargement.  4. Mild aortic stenosis, peak velocity 2.2 m/s and PAZ >2 cm² by continuity equation.  5. Mild tricuspid regurgitation.  No previous TTE available for direct visual comparison.        Left message for patient to return clinic call regarding echo results.     
[FreeTextEntry1] : 10

## 2024-08-30 ENCOUNTER — APPOINTMENT (OUTPATIENT)
Dept: CARDIOLOGY | Facility: CLINIC | Age: 59
End: 2024-08-30

## 2024-11-22 ENCOUNTER — APPOINTMENT (OUTPATIENT)
Dept: CARDIOLOGY | Facility: CLINIC | Age: 59
End: 2024-11-22
Payer: MEDICARE

## 2024-11-22 VITALS
WEIGHT: 273 LBS | HEART RATE: 74 BPM | HEIGHT: 73 IN | OXYGEN SATURATION: 96 % | DIASTOLIC BLOOD PRESSURE: 78 MMHG | SYSTOLIC BLOOD PRESSURE: 128 MMHG | BODY MASS INDEX: 36.18 KG/M2

## 2024-11-22 DIAGNOSIS — I10 ESSENTIAL (PRIMARY) HYPERTENSION: ICD-10-CM

## 2024-11-22 DIAGNOSIS — R94.31 ABNORMAL ELECTROCARDIOGRAM [ECG] [EKG]: ICD-10-CM

## 2024-11-22 DIAGNOSIS — E78.5 HYPERLIPIDEMIA, UNSPECIFIED: ICD-10-CM

## 2024-11-22 DIAGNOSIS — E66.9 OBESITY, UNSPECIFIED: ICD-10-CM

## 2024-11-22 DIAGNOSIS — I11.0 HYPERTENSIVE HEART DISEASE WITH HEART FAILURE: ICD-10-CM

## 2024-11-22 PROCEDURE — 93000 ELECTROCARDIOGRAM COMPLETE: CPT

## 2024-11-22 PROCEDURE — 99406 BEHAV CHNG SMOKING 3-10 MIN: CPT

## 2024-11-22 PROCEDURE — 99214 OFFICE O/P EST MOD 30 MIN: CPT | Mod: 25

## 2025-04-17 ENCOUNTER — NON-APPOINTMENT (OUTPATIENT)
Age: 60
End: 2025-04-17

## 2025-04-17 ENCOUNTER — APPOINTMENT (OUTPATIENT)
Dept: CARDIOLOGY | Facility: CLINIC | Age: 60
End: 2025-04-17
Payer: MEDICARE

## 2025-04-17 VITALS
WEIGHT: 269 LBS | HEART RATE: 70 BPM | DIASTOLIC BLOOD PRESSURE: 72 MMHG | SYSTOLIC BLOOD PRESSURE: 126 MMHG | OXYGEN SATURATION: 97 % | BODY MASS INDEX: 35.65 KG/M2 | HEIGHT: 73 IN

## 2025-04-17 DIAGNOSIS — E78.5 HYPERLIPIDEMIA, UNSPECIFIED: ICD-10-CM

## 2025-04-17 DIAGNOSIS — I10 ESSENTIAL (PRIMARY) HYPERTENSION: ICD-10-CM

## 2025-04-17 DIAGNOSIS — I11.0 HYPERTENSIVE HEART DISEASE WITH HEART FAILURE: ICD-10-CM

## 2025-04-17 DIAGNOSIS — E66.9 OBESITY, UNSPECIFIED: ICD-10-CM

## 2025-04-17 DIAGNOSIS — R94.31 ABNORMAL ELECTROCARDIOGRAM [ECG] [EKG]: ICD-10-CM

## 2025-04-17 PROCEDURE — 99214 OFFICE O/P EST MOD 30 MIN: CPT

## 2025-04-17 PROCEDURE — 93000 ELECTROCARDIOGRAM COMPLETE: CPT

## 2025-04-17 PROCEDURE — G2211 COMPLEX E/M VISIT ADD ON: CPT

## 2025-08-21 ENCOUNTER — APPOINTMENT (OUTPATIENT)
Dept: CARDIOLOGY | Facility: CLINIC | Age: 60
End: 2025-08-21
Payer: MEDICARE

## 2025-08-21 VITALS
WEIGHT: 266 LBS | OXYGEN SATURATION: 96 % | SYSTOLIC BLOOD PRESSURE: 128 MMHG | BODY MASS INDEX: 35.25 KG/M2 | HEIGHT: 73 IN | DIASTOLIC BLOOD PRESSURE: 70 MMHG | HEART RATE: 71 BPM

## 2025-08-21 DIAGNOSIS — R94.31 ABNORMAL ELECTROCARDIOGRAM [ECG] [EKG]: ICD-10-CM

## 2025-08-21 DIAGNOSIS — I11.0 HYPERTENSIVE HEART DISEASE WITH HEART FAILURE: ICD-10-CM

## 2025-08-21 DIAGNOSIS — E66.9 OBESITY, UNSPECIFIED: ICD-10-CM

## 2025-08-21 DIAGNOSIS — E78.5 HYPERLIPIDEMIA, UNSPECIFIED: ICD-10-CM

## 2025-08-21 DIAGNOSIS — I10 ESSENTIAL (PRIMARY) HYPERTENSION: ICD-10-CM

## 2025-08-21 PROCEDURE — 93000 ELECTROCARDIOGRAM COMPLETE: CPT

## 2025-08-21 PROCEDURE — 99214 OFFICE O/P EST MOD 30 MIN: CPT

## 2025-08-21 PROCEDURE — G2211 COMPLEX E/M VISIT ADD ON: CPT
